# Patient Record
Sex: FEMALE | Race: OTHER | NOT HISPANIC OR LATINO
[De-identification: names, ages, dates, MRNs, and addresses within clinical notes are randomized per-mention and may not be internally consistent; named-entity substitution may affect disease eponyms.]

---

## 2022-02-09 ENCOUNTER — RESULT REVIEW (OUTPATIENT)
Age: 47
End: 2022-02-09

## 2022-02-09 ENCOUNTER — APPOINTMENT (OUTPATIENT)
Dept: MRI IMAGING | Facility: CLINIC | Age: 47
End: 2022-02-09
Payer: COMMERCIAL

## 2022-02-09 PROBLEM — Z00.00 ENCOUNTER FOR PREVENTIVE HEALTH EXAMINATION: Status: ACTIVE | Noted: 2022-02-09

## 2022-02-09 PROCEDURE — 70551 MRI BRAIN STEM W/O DYE: CPT

## 2022-02-19 ENCOUNTER — APPOINTMENT (OUTPATIENT)
Dept: MRI IMAGING | Facility: CLINIC | Age: 47
End: 2022-02-19

## 2023-04-25 ENCOUNTER — EMERGENCY (EMERGENCY)
Facility: HOSPITAL | Age: 48
LOS: 1 days | Discharge: HOME | End: 2023-04-25
Attending: EMERGENCY MEDICINE | Admitting: EMERGENCY MEDICINE
Payer: COMMERCIAL

## 2023-04-25 VITALS
RESPIRATION RATE: 16 BRPM | HEART RATE: 77 BPM | DIASTOLIC BLOOD PRESSURE: 75 MMHG | WEIGHT: 130.07 LBS | OXYGEN SATURATION: 99 % | HEIGHT: 67 IN | SYSTOLIC BLOOD PRESSURE: 119 MMHG | TEMPERATURE: 98 F

## 2023-04-25 PROCEDURE — 99284 EMERGENCY DEPT VISIT MOD MDM: CPT

## 2023-04-25 PROCEDURE — 99283 EMERGENCY DEPT VISIT LOW MDM: CPT

## 2023-04-25 RX ORDER — LORATADINE 10 MG/1
1 TABLET ORAL
Qty: 5 | Refills: 0
Start: 2023-04-25 | End: 2023-04-29

## 2023-04-25 RX ORDER — DIPHENHYDRAMINE HCL 50 MG
2 CAPSULE ORAL
Qty: 18 | Refills: 0
Start: 2023-04-25 | End: 2023-04-27

## 2023-04-25 RX ORDER — LORATADINE 10 MG/1
10 TABLET ORAL ONCE
Refills: 0 | Status: COMPLETED | OUTPATIENT
Start: 2023-04-25 | End: 2023-04-25

## 2023-04-25 RX ORDER — DIPHENHYDRAMINE HCL 50 MG
50 CAPSULE ORAL ONCE
Refills: 0 | Status: COMPLETED | OUTPATIENT
Start: 2023-04-25 | End: 2023-04-25

## 2023-04-25 RX ADMIN — Medication 50 MILLIGRAM(S): at 22:12

## 2023-04-25 RX ADMIN — LORATADINE 10 MILLIGRAM(S): 10 TABLET ORAL at 22:12

## 2023-04-25 NOTE — ED PROVIDER NOTE - MUSCULOSKELETAL, MLM
2oz/nectar thick soft solid/2oz Spine appears normal, range of motion is not limited, no muscle or joint tenderness

## 2023-04-25 NOTE — ED ADULT TRIAGE NOTE - CHIEF COMPLAINT QUOTE
pt c/o body rash beginning sunday. relief with benadryl, but after 4 hours rash returns. pt started iron pills and Coq10 on sunday.

## 2023-04-25 NOTE — ED PROVIDER NOTE - OBJECTIVE STATEMENT
48 F w ho anemia- started on Fe- new preparation for her- took 2 doses and took 1 dose coQ- then developed diffuse hives- int- sx improve w benadryl- then return a few hours later- diffuse scattered rash that changes locations  no sob no f/c no n/v  mod severity no throat pain/swelling  no sob

## 2023-04-25 NOTE — ED PROVIDER NOTE - NSFOLLOWUPINSTRUCTIONS_ED_ALL_ED_FT
Hives  Hives (urticaria) are itchy, red, swollen areas on the skin. Hives can appear on any part of the body. Hives often fade within 24 hours (acute hives). Sometimes, new hives appear after old ones fade and the cycle can continue for several days or weeks (chronic hives). Hives do not spread from person to person (are not contagious).    Hives come from the body's reaction to something a person is allergic to (allergen), something that causes irritation, or various other triggers. When a person is exposed to a trigger, his or her body releases a chemical (histamine) that causes redness, itching, and swelling. Hives can appear right after exposure to a trigger or hours later.    What are the causes?  This condition may be caused by:  Allergies to foods or ingredients.  Insect bites or stings.  Exposure to pollen or pets.  Spending time in sunlight, heat, or cold (exposure).  Exercise.  Stress.  You can also get hives from other medical conditions and treatments, such as:  Viruses, including the common cold.  Bacterial infections, such as urinary tract infections and strep throat.  Certain medicines.  Contact with latex or chemicals.  Allergy shots.  Blood transfusions.  Sometimes, the cause of this condition is not known (idiopathic hives).    What increases the risk?  You are more likely to develop this condition if you:  Are a woman.  Have food allergies, especially to citrus fruits, milk, eggs, peanuts, tree nuts, or shellfish.  Are allergic to:  Medicines.  Latex.  Insects.  Animals.  Pollen.  What are the signs or symptoms?  A red rash on a person's upper arm.  Common symptoms of this condition include raised, itchy, red or white bumps or patches on your skin. These areas may:  Become large and swollen (welts).  Change in shape and location, quickly and repeatedly.  Be separate hives or connect over a large area of skin.  Sting or become painful.  Turn white when pressed in the center (anthony).  In severe cases, your hands, feet, and face may also become swollen. This may occur if hives develop deeper in your skin.    How is this diagnosed?  This condition may be diagnosed by your symptoms, medical history, and physical exam.  Your skin, urine, or blood may be tested to find out what is causing your hives and to rule out other health issues.  Your health care provider may also remove a small sample of skin from the affected area and examine it under a microscope (biopsy).  How is this treated?  Treatment for this condition depends on the cause and severity of your symptoms. Your health care provider may recommend using cool, wet cloths (cool compresses) or taking cool showers to relieve itching. Treatment may include:  Medicines that help:  Relieve itching (antihistamines).  Reduce swelling (corticosteroids).  Treat infection (antibiotics).  An injectable medicine (omalizumab). Your health care provider may prescribe this if you have chronic idiopathic hives and you continue to have symptoms even after treatment with antihistamines.  Severe cases may require an emergency injection of adrenaline (epinephrine) to prevent a life-threatening allergic reaction (anaphylaxis).    Follow these instructions at home:  Medicines    Take and apply over-the-counter and prescription medicines only as told by your health care provider.  If you were prescribed an antibiotic medicine, take it as told by your health care provider. Do not stop using the antibiotic even if you start to feel better.  Skin care    Apply cool compresses to the affected areas.  Do not scratch or rub your skin.  General instructions    Do not take hot showers or baths. This can make itching worse.  Do not wear tight-fitting clothing.  Use sunscreen and wear protective clothing when you are outside.  Avoid any substances that cause your hives. Keep a journal to help track what causes your hives. Write down:  What medicines you take.  What you eat and drink.  What products you use on your skin.  Keep all follow-up visits as told by your health care provider. This is important.  Contact a health care provider if:  Your symptoms are not controlled with medicine.  Your joints are painful or swollen.  Get help right away if:  You have a fever.  You have pain in your abdomen.  Your tongue or lips are swollen.  Your eyelids are swollen.  Your chest or throat feels tight.  You have trouble breathing or swallowing.  These symptoms may represent a serious problem that is an emergency. Do not wait to see if the symptoms will go away. Get medical help right away. Call your local emergency services (911 in the [place] Do not drive yourself to the hospital.    Summary  Hives (urticaria) are itchy, red, swollen areas on your skin. Hives come from the body's reaction to something a person is allergic to (allergen), something that causes irritation, or various other triggers.  Treatment for this condition depends on the cause and severity of your symptoms.  Avoid any substances that cause your hives. Keep a journal to help track what causes your hives.  Take and apply over-the-counter and prescription medicines only as told by your health care provider.  Get help right away if your chest or throat feels tight or if you have trouble breathing or swallowing.  This information is not intended to replace advice given to you by your health care provider. Make sure you discuss any questions you have with your health care provider.    Document Revised: 02/06/2022 Document Reviewed: 02/06/2022

## 2023-04-25 NOTE — ED PROVIDER NOTE - PATIENT PORTAL LINK FT
You can access the FollowMyHealth Patient Portal offered by [facility] by registering at the following website: [email or web address]. By joining [employer]’s FollowMyHealth portal, you will also be able to view your health information using other applications (apps) compatible with our system.

## 2023-04-25 NOTE — ED ADULT NURSE NOTE - OBJECTIVE STATEMENT
Pt received aaox3 c/o diffuse body rash since 4/23. Pt states she took benadryl and had partial relief but then hives come back. pt only endorses newly starting pt started iron pills and Coq10 on Sunday.

## 2023-04-25 NOTE — ED PROVIDER NOTE - CLINICAL SUMMARY MEDICAL DECISION MAKING FREE TEXT BOX
hives- 2' to one of two new meds likely- Fe or Coq- advised to pause both meds- steroids/benadryl- fu pcp in 1 week

## 2023-04-26 DIAGNOSIS — D64.9 ANEMIA, UNSPECIFIED: ICD-10-CM

## 2023-04-26 DIAGNOSIS — R21 RASH AND OTHER NONSPECIFIC SKIN ERUPTION: ICD-10-CM

## 2023-04-26 DIAGNOSIS — L50.0 ALLERGIC URTICARIA: ICD-10-CM
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
## 2023-04-26 DIAGNOSIS — T45.8X5A ADVERSE EFFECT OF OTHER PRIMARILY SYSTEMIC AND HEMATOLOGICAL AGENTS, INITIAL ENCOUNTER: ICD-10-CM
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
## 2024-02-26 ENCOUNTER — OUTPATIENT (OUTPATIENT)
Dept: OUTPATIENT SERVICES | Facility: HOSPITAL | Age: 49
LOS: 1 days | End: 2024-02-26

## 2024-02-26 VITALS
TEMPERATURE: 99 F | SYSTOLIC BLOOD PRESSURE: 114 MMHG | HEIGHT: 67.5 IN | WEIGHT: 130.07 LBS | RESPIRATION RATE: 16 BRPM | HEART RATE: 74 BPM | DIASTOLIC BLOOD PRESSURE: 76 MMHG | OXYGEN SATURATION: 98 %

## 2024-02-26 DIAGNOSIS — M20.12 HALLUX VALGUS (ACQUIRED), LEFT FOOT: ICD-10-CM

## 2024-02-26 DIAGNOSIS — Z90.49 ACQUIRED ABSENCE OF OTHER SPECIFIED PARTS OF DIGESTIVE TRACT: Chronic | ICD-10-CM

## 2024-02-26 LAB
HCG SERPL-ACNC: <1 MIU/ML — SIGNIFICANT CHANGE UP
HCT VFR BLD CALC: 38.1 % — SIGNIFICANT CHANGE UP (ref 34.5–45)
HGB BLD-MCNC: 12.1 G/DL — SIGNIFICANT CHANGE UP (ref 11.5–15.5)
MCHC RBC-ENTMCNC: 26.5 PG — LOW (ref 27–34)
MCHC RBC-ENTMCNC: 31.8 GM/DL — LOW (ref 32–36)
MCV RBC AUTO: 83.4 FL — SIGNIFICANT CHANGE UP (ref 80–100)
NRBC # BLD: 0 /100 WBCS — SIGNIFICANT CHANGE UP (ref 0–0)
NRBC # FLD: 0 K/UL — SIGNIFICANT CHANGE UP (ref 0–0)
PLATELET # BLD AUTO: 275 K/UL — SIGNIFICANT CHANGE UP (ref 150–400)
RBC # BLD: 4.57 M/UL — SIGNIFICANT CHANGE UP (ref 3.8–5.2)
RBC # FLD: 13.2 % — SIGNIFICANT CHANGE UP (ref 10.3–14.5)
WBC # BLD: 5.4 K/UL — SIGNIFICANT CHANGE UP (ref 3.8–10.5)
WBC # FLD AUTO: 5.4 K/UL — SIGNIFICANT CHANGE UP (ref 3.8–10.5)

## 2024-02-26 NOTE — H&P PST ADULT - PROBLEM SELECTOR PLAN 1
Scheduled for left bunionectomy tentatively on 3/1/2024. CBC, HCG done and results pending.  Verbal and written preop instruction given and explained. Patient verbalized understanding. Chlorhexidine antiseptic solution with written and verbal instruction given & Patient verbalized understanding with return demonstration.   Famotidine provided with instruction.   Instructed to bring urine sample  on the day of surgery for UCG. Container provided.

## 2024-02-26 NOTE — H&P PST ADULT - NSICDXFAMILYHX_GEN_ALL_CORE_FT
Convey Results (Plan for procedures)     Jossy Ramírez MA   [facility] Sched Pool 59 minutes ago (12:48 PM)       FLEXIBLE SIGMOIDOSCOPY BX     Pt is aware that this was added     (Routing comment)       Jossy Ramírez MA 1 hour ago (12:47 PM)      Pt informed with message listed below and understands verbal instructions given.        FLEXIBLE SIGMOIDOSCOPY BX added to 2 page and routed to scheduling.           Documentation       Talia Cook NP routed conversation to MAGALI Cook Np Nurse Msg Pool 20 hours ago (5:39 PM)      Talia Cook NP 20 hours ago (5:38 PM)      Please let patient know:   Discussed with Dr. Rubalcava, will plan for EGD and flexible sigmoidoscopy.  No need for colonoscopy at this time.  Order has been placed for flex sig.  Thank you.  Michelle Quintana A  Female, 67 year old, 1950,   MRN:   [id number]   FAMILY HISTORY:  No pertinent family history in first degree relatives FAMILY HISTORY:  No pertinent family history in first degree relatives

## 2024-02-26 NOTE — H&P PST ADULT - ANESTHESIA, PREVIOUS REACTION, PROFILE
"after cholecystectomy in 6/2022- [facility]" / Denies family hx/delayed awakening

## 2024-02-26 NOTE — H&P PST ADULT - HISTORY OF PRESENT ILLNESS
47 yo female with hx of anemia presents to have PST evaluation with c/o left foot pain over a year, went for an evaluation, had x - ray done, now scheduled for left bunionectomy with Dr. Wood.  47 yo female with hx of anemia presents to have PST evaluation with c/o left foot pain over a year, went for an evaluation, had x - ray done, dx hallux valgus, left foot, now scheduled for left bunionectomy with Dr. Wood.  47 yo female with hx of anemia presents to have PST evaluation with c/o left foot pain over a year, went for an evaluation, had x - ray done, dx hallux valgus, left foot, now scheduled for left bunionectomy with Dr. Wood. .

## 2024-02-26 NOTE — H&P PST ADULT - NEGATIVE ENMT SYMPTOMS
no hearing difficulty/no ear pain/no tinnitus/no vertigo/no sinus symptoms/no nasal congestion/no nasal discharge/no nasal obstruction/no throat pain

## 2024-02-29 ENCOUNTER — TRANSCRIPTION ENCOUNTER (OUTPATIENT)
Age: 49
End: 2024-02-29

## 2024-03-01 ENCOUNTER — RESULT REVIEW (OUTPATIENT)
Age: 49
End: 2024-03-01

## 2024-03-01 ENCOUNTER — OUTPATIENT (OUTPATIENT)
Dept: OUTPATIENT SERVICES | Facility: HOSPITAL | Age: 49
LOS: 1 days | Discharge: HOME | End: 2024-03-01
Payer: COMMERCIAL

## 2024-03-01 ENCOUNTER — TRANSCRIPTION ENCOUNTER (OUTPATIENT)
Age: 49
End: 2024-03-01

## 2024-03-01 VITALS
OXYGEN SATURATION: 100 % | SYSTOLIC BLOOD PRESSURE: 106 MMHG | HEART RATE: 63 BPM | DIASTOLIC BLOOD PRESSURE: 74 MMHG | TEMPERATURE: 98 F

## 2024-03-01 VITALS
OXYGEN SATURATION: 98 % | DIASTOLIC BLOOD PRESSURE: 86 MMHG | RESPIRATION RATE: 16 BRPM | WEIGHT: 130.07 LBS | SYSTOLIC BLOOD PRESSURE: 111 MMHG | HEIGHT: 67.5 IN | TEMPERATURE: 98 F | HEART RATE: 69 BPM

## 2024-03-01 DIAGNOSIS — Z90.49 ACQUIRED ABSENCE OF OTHER SPECIFIED PARTS OF DIGESTIVE TRACT: Chronic | ICD-10-CM

## 2024-03-01 DIAGNOSIS — M20.12 HALLUX VALGUS (ACQUIRED), LEFT FOOT: ICD-10-CM

## 2024-03-01 PROCEDURE — 73630 X-RAY EXAM OF FOOT: CPT | Mod: 26,LT

## 2024-03-01 DEVICE — K-WIRE WRIGHT MEDICAL (SINGLE TROCAR) BLUNT 1.4MM X 150MM: Type: IMPLANTABLE DEVICE | Site: LEFT | Status: FUNCTIONAL

## 2024-03-01 DEVICE — IMPLANTABLE DEVICE: Type: IMPLANTABLE DEVICE | Site: LEFT | Status: FUNCTIONAL

## 2024-03-01 NOTE — ASU DISCHARGE PLAN (ADULT/PEDIATRIC) - NS MD DC FALL RISK RISK
For information on Fall & Injury Prevention, visit: [email or web address] OR  [email or web address] OR  https://www.cdc.gov/steadi/patient.html

## 2024-03-01 NOTE — ASU DISCHARGE PLAN (ADULT/PEDIATRIC) - ASU DC SPECIAL INSTRUCTIONSFT
s/p L foot MIS bunionectomy   - keep your dressings clean dry and inact   - take your pain medications as instructed  - weight bear only to the L foot heel in surgical shoe   - Follow up with Dr. Wood within 1 week

## 2024-03-01 NOTE — ASU DISCHARGE PLAN (ADULT/PEDIATRIC) - CARE PROVIDER_API CALL
Britt Wood  Foot and Ankle Surgery  [address], [place] [address]  Phone: [phone]  Fax: [phone]  Established Patient  Follow Up Time: 1 week

## 2024-03-23 NOTE — H&P PST ADULT - MUSCULOSKELETAL COMMENTS
Adriana Kerr (:  1966) is a 58 y.o. female, here for evaluation of the following chief complaint(s):  Emesis and Diarrhea (Pt c/o vomiting, diarrhea, nausea, chills x 3 days)      ASSESSMENT/PLAN:       Summary    - Patient's exam combined with the details of their complaint proved noteworthy in areas that suggest pathology correlating with the diagnosis.     - Will order a urine culture to further determine and/or rule out the cause for this patient's complaint.     Differentials: Pyelonephritis, Acute Hepatitis, Appendicitis, STD, Pancreatitis, Bowel Obstruction, Abdominal Abscess, Cystitis.    SUBJECTIVE/OBJECTIVE:  HPI    Adriana presents for abdominal pain which onset about {0-10:01584} days ago and has been {clinical course - history:17::\"unchanged\"}. Associated symptoms: {assoc symptoms:621}. The patient denies {assoc symptoms:621}. Abdominal surgical history consists of ***. Her last bowel movement was ***. She is *** sexually active.    She has vomitted *** times and the last was ***.    Her last menstrual cycle was ***.     Furthermore, She ***.      Vitals:    24 1439   BP: 122/78   Pulse: 86   Resp: 18   Temp: 98.1 °F (36.7 °C)   SpO2: 95%   Weight: 113.4 kg (250 lb)   Height: 1.676 m (5' 6\")       No results found for this visit on 24.     No orders to display       Review of Systems    Physical Exam       An electronic signature was used to authenticate this note.    Bandar Srivastava, APRN - CNP             preop dx of hallux valgus acquired left foot

## 2025-03-03 NOTE — ED ADULT NURSE NOTE - BREATH SOUNDS, MLM
Contact Numbers    [facility] Main Line (for Scheduling/Triage/After Hours Nurse Line): [phone]    Please call the [facility] nurse triage or the after hours nurse line if you experience a temperature greater than or equal to 100.4, shaking chills, have uncontrolled nausea, vomiting and/or diarrhea, dizziness, lightheadedness, shortness of breath, chest pain, bleeding, unexplained bruising, or if you have any other new/concerning symptoms, questions or concerns.     If you are having any concerning symptoms or wish to speak to a provider before your next infusion visit, please call your care coordinator or triage to notify them so we can adequately serve you.     If you need any refills on medications (narcotics or other medications), please call before your infusion appointment.        Lab Results:  Recent Results (from the past 12 hours)   Comprehensive metabolic panel    Collection Time: 03/03/25 12:04 PM   Result Value Ref Range    Sodium 141 135 - 145 mmol/L    Potassium 4.2 3.4 - 5.3 mmol/L    Carbon Dioxide (CO2) 27 22 - 29 mmol/L    Anion Gap 9 7 - 15 mmol/L    Urea Nitrogen 19.1 8.0 - 23.0 mg/dL    Creatinine 0.82 0.67 - 1.17 mg/dL    GFR Estimate 89 >60 mL/min/1.73m2    Calcium 9.4 8.8 - 10.4 mg/dL    Chloride 105 98 - 107 mmol/L    Glucose 87 70 - 99 mg/dL    Alkaline Phosphatase 109 40 - 150 U/L    AST 23 0 - 45 U/L    ALT 18 0 - 70 U/L    Protein Total 7.0 6.4 - 8.3 g/dL    Albumin 4.0 3.5 - 5.2 g/dL    Bilirubin Total 0.3 <=1.2 mg/dL   Magnesium    Collection Time: 03/03/25 12:04 PM   Result Value Ref Range    Magnesium 1.8 1.7 - 2.3 mg/dL   CBC with platelets and differential    Collection Time: 03/03/25 12:04 PM   Result Value Ref Range    WBC Count 5.3 4.0 - 11.0 10e3/uL    RBC Count 3.92 (L) 4.40 - 5.90 10e6/uL    Hemoglobin 9.8 (L) 13.3 - 17.7 g/dL    Hematocrit 32.7 (L) 40.0 - 53.0 %    MCV 83 78 - 100 fL    MCH 25.0 (L) 26.5 - 33.0 pg    MCHC 30.0 (L) 31.5 - 36.5 g/dL    RDW 17.3 (H) 10.0 - 15.0 %     Platelet Count 219 150 - 450 10e3/uL    % Neutrophils 50 %    % Lymphocytes 35 %    % Monocytes 12 %    % Eosinophils 2 %    % Basophils 1 %    % Immature Granulocytes 1 %    NRBCs per 100 WBC 0 <1 /100    Absolute Neutrophils 2.6 1.6 - 8.3 10e3/uL    Absolute Lymphocytes 1.9 0.8 - 5.3 10e3/uL    Absolute Monocytes 0.6 0.0 - 1.3 10e3/uL    Absolute Eosinophils 0.1 0.0 - 0.7 10e3/uL    Absolute Basophils 0.0 0.0 - 0.2 10e3/uL    Absolute Immature Granulocytes 0.0 <=0.4 10e3/uL    Absolute NRBCs 0.0 10e3/uL       Clear

## (undated) DEVICE — PREP BETADINE KIT

## (undated) DEVICE — PACK LIJ BASIC ORTHO

## (undated) DEVICE — TUBING IRRIGATION 400MM

## (undated) DEVICE — LABELS BLANK W PEN

## (undated) DEVICE — SYR LUER LOK 10CC

## (undated) DEVICE — POSITIONER PATIENT SAFETY STRAP 3X60"

## (undated) DEVICE — PREP CHLORAPREP HI-LITE ORANGE 26ML

## (undated) DEVICE — VENODYNE/SCD SLEEVE CALF MEDIUM

## (undated) DEVICE — BUR STRYKER OVAL 4 X 38MM

## (undated) DEVICE — SAW BLADE MICROAIRE SAGITTAL 9.4MMX25.4MMX0.6MM

## (undated) DEVICE — DRILL BIT WRIGHT MEDICAL 3X60MM

## (undated) DEVICE — TOURNIQUET CUFF 18" DUAL PORT SINGLE BLADDER LUER LOCK (BLACK)

## (undated) DEVICE — ELCTR GROUNDING PAD ADULT COVIDIEN

## (undated) DEVICE — SUT VICRYL 3-0 18" PS-2 UNDYED

## (undated) DEVICE — SUT MONOCRYL 4-0 27" PS-2 UNDYED

## (undated) DEVICE — WARMING BLANKET UPPER ADULT

## (undated) DEVICE — SOL IRR POUR NS 0.9% 500ML

## (undated) DEVICE — DRSG STOCKINETTE TUBULAR 6"

## (undated) DEVICE — ELCTR ROCKER SWITCH PENCIL BLUE 10FT

## (undated) DEVICE — SUT VICRYL 4-0 18" PS-2 UNDYED

## (undated) DEVICE — POSITIONER FOAM EGG CRATE ULNAR 2PCS (PINK)

## (undated) DEVICE — GLV 7.5 PROTEXIS (WHITE)

## (undated) DEVICE — SOL IRR POUR H2O 500ML

## (undated) DEVICE — DRSG KLING 4"

## (undated) DEVICE — Device

## (undated) DEVICE — DRSG CURITY GAUZE SPONGE 4 X 4" 12-PLY

## (undated) DEVICE — FRAZIER SUCTION TIP 8FR

## (undated) DEVICE — TOURNIQUET ESMARK 4"

## (undated) DEVICE — DRSG ACE BANDAGE 4" NS

## (undated) DEVICE — BLADE SURGICAL #15 CARBON

## (undated) DEVICE — NDL HYPO SAFE 18G X 1.5" (PINK)

## (undated) DEVICE — SUT ETHILON 4-0 18" PS-2

## (undated) DEVICE — NDL HYPO REGULAR BEVEL 25G X 1.5" (BLUE)